# Patient Record
Sex: FEMALE | Race: WHITE | NOT HISPANIC OR LATINO | URBAN - METROPOLITAN AREA
[De-identification: names, ages, dates, MRNs, and addresses within clinical notes are randomized per-mention and may not be internally consistent; named-entity substitution may affect disease eponyms.]

---

## 2022-04-30 ENCOUNTER — TELEPHONE ENCOUNTER (OUTPATIENT)
Dept: URBAN - METROPOLITAN AREA CLINIC 121 | Facility: CLINIC | Age: 62
End: 2022-04-30

## 2022-05-01 ENCOUNTER — TELEPHONE ENCOUNTER (OUTPATIENT)
Dept: URBAN - METROPOLITAN AREA CLINIC 121 | Facility: CLINIC | Age: 62
End: 2022-05-01

## 2022-05-01 RX ORDER — TIOCONAZOLE 6.5 %
OINTMENT WITH PREFILLED APPLICATOR VAGINAL
Status: ACTIVE | COMMUNITY
Start: 2018-11-14

## 2022-05-01 RX ORDER — ACETAMINOPHEN 325 MG/1
PRN TABLET, FILM COATED ORAL
Status: ACTIVE | COMMUNITY
Start: 2013-01-28

## 2023-10-17 ENCOUNTER — DASHBOARD ENCOUNTERS (OUTPATIENT)
Age: 63
End: 2023-10-17

## 2023-10-17 ENCOUNTER — OFFICE VISIT (OUTPATIENT)
Dept: URBAN - METROPOLITAN AREA CLINIC 27 | Facility: CLINIC | Age: 63
End: 2023-10-17
Payer: COMMERCIAL

## 2023-10-17 VITALS
RESPIRATION RATE: 17 BRPM | BODY MASS INDEX: 25.52 KG/M2 | SYSTOLIC BLOOD PRESSURE: 141 MMHG | WEIGHT: 130 LBS | DIASTOLIC BLOOD PRESSURE: 87 MMHG | HEART RATE: 83 BPM | HEIGHT: 60 IN

## 2023-10-17 DIAGNOSIS — R14.0 BLOATING: ICD-10-CM

## 2023-10-17 DIAGNOSIS — I10 HYPERTENSION, ESSENTIAL: ICD-10-CM

## 2023-10-17 DIAGNOSIS — Z86.010 PERSONAL HISTORY OF COLONIC POLYPS: ICD-10-CM

## 2023-10-17 PROCEDURE — 99244 OFF/OP CNSLTJ NEW/EST MOD 40: CPT | Performed by: INTERNAL MEDICINE

## 2023-10-17 PROCEDURE — 99204 OFFICE O/P NEW MOD 45 MIN: CPT | Performed by: INTERNAL MEDICINE

## 2023-10-17 RX ORDER — TIOCONAZOLE 6.5 %
OINTMENT WITH PREFILLED APPLICATOR VAGINAL
Status: ACTIVE | COMMUNITY
Start: 2018-11-14

## 2023-10-17 RX ORDER — ACETAMINOPHEN 325 MG/1
PRN TABLET, FILM COATED ORAL
Status: ACTIVE | COMMUNITY
Start: 2013-01-28

## 2023-10-17 NOTE — HPI-TODAY'S VISIT:
This is a 63-year-old female seen as a new patient in consultation at the request of Dr. Bermudez for gas and bloating.  Sometimes bowel movements will release the tightness.  She does have some mild constipation.  She takes lisinopril for hypertension.  No dysuria.  She is using smooth move tea she had a colonoscopy in 2018 with Dr. Reynoso.  She has a history of colon polyps and tubular adenoma in the past

## 2023-10-18 ENCOUNTER — LAB OUTSIDE AN ENCOUNTER (OUTPATIENT)
Dept: URBAN - METROPOLITAN AREA CLINIC 27 | Facility: CLINIC | Age: 63
End: 2023-10-18

## 2023-11-03 ENCOUNTER — OFFICE VISIT (OUTPATIENT)
Dept: URBAN - METROPOLITAN AREA SURGERY CENTER 7 | Facility: SURGERY CENTER | Age: 63
End: 2023-11-03

## 2023-12-14 ENCOUNTER — OFFICE VISIT (OUTPATIENT)
Dept: URBAN - METROPOLITAN AREA SURGERY CENTER 7 | Facility: SURGERY CENTER | Age: 63
End: 2023-12-14

## 2024-01-19 ENCOUNTER — OUT OF OFFICE VISIT (OUTPATIENT)
Dept: URBAN - METROPOLITAN AREA SURGERY CENTER 7 | Facility: SURGERY CENTER | Age: 64
End: 2024-01-19
Payer: COMMERCIAL

## 2024-01-19 ENCOUNTER — CLAIMS CREATED FROM THE CLAIM WINDOW (OUTPATIENT)
Dept: URBAN - METROPOLITAN AREA CLINIC 4 | Facility: CLINIC | Age: 64
End: 2024-01-19
Payer: COMMERCIAL

## 2024-01-19 DIAGNOSIS — Z86.010 PERSONAL HISTORY OF COLON POLYPS: ICD-10-CM

## 2024-01-19 DIAGNOSIS — K63.89 APPENDICITIS EPIPLOICA: ICD-10-CM

## 2024-01-19 DIAGNOSIS — Z86.010 ADENOMAS PERSONAL HISTORY OF COLONIC POLYPS: ICD-10-CM

## 2024-01-19 DIAGNOSIS — D12.5 ADENOMATOUS POLYP OF SIGMOID COLON: ICD-10-CM

## 2024-01-19 DIAGNOSIS — Z09 ENCNTR FOR F/U EXAM AFT TRTMT FOR COND OTH THAN MALIG NEOPLM: ICD-10-CM

## 2024-01-19 DIAGNOSIS — K64.8 INTERNAL HEMORRHOIDS: ICD-10-CM

## 2024-01-19 DIAGNOSIS — K63.89 OTHER SPECIFIED DISEASES OF INTESTINE: ICD-10-CM

## 2024-01-19 PROCEDURE — 88305 TISSUE EXAM BY PATHOLOGIST: CPT | Performed by: PATHOLOGY

## 2024-01-19 PROCEDURE — 00811 ANES LWR INTST NDSC NOS: CPT | Performed by: NURSE ANESTHETIST, CERTIFIED REGISTERED

## 2024-01-19 PROCEDURE — 45380 COLONOSCOPY AND BIOPSY: CPT | Performed by: INTERNAL MEDICINE

## 2024-01-19 PROCEDURE — G8907 PT DOC NO EVENTS ON DISCHARG: HCPCS | Performed by: INTERNAL MEDICINE

## 2024-01-19 RX ORDER — ACETAMINOPHEN 325 MG/1
PRN TABLET, FILM COATED ORAL
Status: ACTIVE | COMMUNITY
Start: 2013-01-28

## 2024-01-19 RX ORDER — TIOCONAZOLE 6.5 %
OINTMENT WITH PREFILLED APPLICATOR VAGINAL
Status: ACTIVE | COMMUNITY
Start: 2018-11-14